# Patient Record
Sex: MALE | Race: WHITE | URBAN - METROPOLITAN AREA
[De-identification: names, ages, dates, MRNs, and addresses within clinical notes are randomized per-mention and may not be internally consistent; named-entity substitution may affect disease eponyms.]

---

## 2018-05-10 ENCOUNTER — TELEPHONE (OUTPATIENT)
Dept: DERMATOLOGY | Facility: CLINIC | Age: 53
End: 2018-05-10

## 2018-05-10 NOTE — TELEPHONE ENCOUNTER
"Patients wife is calling to see if she can be seeing within the next two week. Excision of Melanoma one week ago. No referral has been initiated. Advised patients wife to contact the dermatologist the did the excision to place referral since she states this needs to be \"taken care of in the next two weeks\" with lymph node biopsies.   Informed her that our surgeon is out until May 28 2018.     She will contact Dr. Jonas to see if has availability.     Kay Driscoll RN     "